# Patient Record
Sex: FEMALE
[De-identification: names, ages, dates, MRNs, and addresses within clinical notes are randomized per-mention and may not be internally consistent; named-entity substitution may affect disease eponyms.]

---

## 2024-08-27 ENCOUNTER — DASHBOARD ENCOUNTERS (OUTPATIENT)
Age: 54
End: 2024-08-27

## 2024-09-06 ENCOUNTER — LAB OUTSIDE AN ENCOUNTER (OUTPATIENT)
Dept: URBAN - METROPOLITAN AREA CLINIC 70 | Facility: CLINIC | Age: 54
End: 2024-09-06

## 2024-09-06 ENCOUNTER — OFFICE VISIT (OUTPATIENT)
Dept: URBAN - METROPOLITAN AREA CLINIC 70 | Facility: CLINIC | Age: 54
End: 2024-09-06
Payer: COMMERCIAL

## 2024-09-06 VITALS
BODY MASS INDEX: 47.09 KG/M2 | HEIGHT: 66 IN | HEART RATE: 79 BPM | DIASTOLIC BLOOD PRESSURE: 81 MMHG | WEIGHT: 293 LBS | SYSTOLIC BLOOD PRESSURE: 156 MMHG | TEMPERATURE: 97.7 F

## 2024-09-06 DIAGNOSIS — R13.19 ESOPHAGEAL DYSPHAGIA: ICD-10-CM

## 2024-09-06 DIAGNOSIS — Z86.010 HISTORY OF COLON POLYPS: ICD-10-CM

## 2024-09-06 PROCEDURE — 99204 OFFICE O/P NEW MOD 45 MIN: CPT | Performed by: REGISTERED NURSE

## 2024-09-06 RX ORDER — PANTOPRAZOLE SODIUM 40 MG/1
1 TABLET TABLET, DELAYED RELEASE ORAL ONCE A DAY
Qty: 90 TABLET | Refills: 3 | OUTPATIENT
Start: 2024-09-06

## 2024-09-06 RX ORDER — LEVOTHYROXINE SODIUM 125 UG/1
1 TABLET IN THE MORNING ON AN EMPTY STOMACH TABLET ORAL ONCE A DAY
Status: ACTIVE | COMMUNITY

## 2024-09-06 RX ORDER — CITALOPRAM 10 MG/1
1 TABLET TABLET, FILM COATED ORAL ONCE A DAY
Status: ACTIVE | COMMUNITY

## 2024-09-06 NOTE — HPI-TODAY'S VISIT:
Pt presents with c/o dysphagia. They report intermittent dysphagia for solids. Dysphagia has been present for 1 year. Associated symptoms are none. Prior workup was none. She denies any heartburn or other GERD type symptoms.  Pt presentsis also due for a colonoscopy. Colonoscopy was done 1/28/22 by Dr Nichole with finding of multiple adenomatous polyps, including 4 that were >1 cm in size. Pt currently denies any abdominal pain, diarrhea, constipation, rectal bleeding or weight loss.

## 2024-09-30 ENCOUNTER — OFFICE VISIT (OUTPATIENT)
Dept: URBAN - METROPOLITAN AREA MEDICAL CENTER 42 | Facility: MEDICAL CENTER | Age: 54
End: 2024-09-30

## 2024-09-30 RX ORDER — LEVOTHYROXINE SODIUM 125 UG/1
1 TABLET IN THE MORNING ON AN EMPTY STOMACH TABLET ORAL ONCE A DAY
Status: ACTIVE | COMMUNITY

## 2024-09-30 RX ORDER — CITALOPRAM 10 MG/1
1 TABLET TABLET, FILM COATED ORAL ONCE A DAY
Status: ACTIVE | COMMUNITY

## 2024-09-30 RX ORDER — PANTOPRAZOLE SODIUM 40 MG/1
1 TABLET TABLET, DELAYED RELEASE ORAL ONCE A DAY
Qty: 90 TABLET | Refills: 3 | Status: ACTIVE | COMMUNITY
Start: 2024-09-06

## 2024-11-01 ENCOUNTER — OFFICE VISIT (OUTPATIENT)
Dept: URBAN - METROPOLITAN AREA CLINIC 70 | Facility: CLINIC | Age: 54
End: 2024-11-01
Payer: COMMERCIAL

## 2024-11-01 VITALS
DIASTOLIC BLOOD PRESSURE: 74 MMHG | HEIGHT: 66 IN | TEMPERATURE: 98.5 F | SYSTOLIC BLOOD PRESSURE: 117 MMHG | BODY MASS INDEX: 47.09 KG/M2 | HEART RATE: 70 BPM | WEIGHT: 293 LBS

## 2024-11-01 DIAGNOSIS — R19.7 DIARRHEA, UNSPECIFIED: ICD-10-CM

## 2024-11-01 PROCEDURE — 99214 OFFICE O/P EST MOD 30 MIN: CPT | Performed by: REGISTERED NURSE

## 2024-11-01 RX ORDER — CITALOPRAM 10 MG/1
1 TABLET TABLET, FILM COATED ORAL ONCE A DAY
Status: ACTIVE | COMMUNITY

## 2024-11-01 RX ORDER — PANTOPRAZOLE SODIUM 40 MG/1
1 TABLET TABLET, DELAYED RELEASE ORAL ONCE A DAY
Qty: 90 TABLET | Refills: 3 | Status: ACTIVE | COMMUNITY
Start: 2024-09-06

## 2024-11-01 RX ORDER — LEVOTHYROXINE SODIUM 125 UG/1
1 TABLET IN THE MORNING ON AN EMPTY STOMACH TABLET ORAL ONCE A DAY
Status: ACTIVE | COMMUNITY

## 2024-11-01 NOTE — HPI-TODAY'S VISIT:
EGD 9/30/24 showed a benign esophageal stricture that was dilated to 18mm Colonoscopy 9/30/24 was normal(5 yr recall due to hx polyps)  Her dysphagia has completely resolved after the dilatation.  Pt c/o diarrhea. Diarrhea has been present for 1 year although she did not report this at her last OV. They are having 4-5 BMs/day. Stool is loose. Associated symptoms are none.

## 2024-11-01 NOTE — HPI-OTHER HISTORIES
Note from OV 9/6/24: Pt presents with c/o dysphagia. They report intermittent dysphagia for solids. Dysphagia has been present for 1 year. Associated symptoms are none. Prior workup was none. She denies any heartburn or other GERD type symptoms. Pt presentsis also due for a colonoscopy. Colonoscopy was done 1/28/22 by Dr Nichole with finding of multiple adenomatous polyps, including 4 that were >1 cm in size. Pt currently denies any abdominal pain, diarrhea, constipation, rectal bleeding or weight loss. - - - - - - - - - - - - - - - - - - - - - - - - - -

## 2024-11-25 LAB
IMMUNOGLOBULIN A, QN, SERUM: 308
INTERPRETATION: (no result)
T-TRANSGLUTAMINASE (TTG) IGA: <1

## 2024-12-13 ENCOUNTER — OFFICE VISIT (OUTPATIENT)
Dept: URBAN - METROPOLITAN AREA CLINIC 70 | Facility: CLINIC | Age: 54
End: 2024-12-13
Payer: COMMERCIAL

## 2024-12-13 VITALS
DIASTOLIC BLOOD PRESSURE: 78 MMHG | WEIGHT: 293 LBS | HEIGHT: 66 IN | OXYGEN SATURATION: 96 % | SYSTOLIC BLOOD PRESSURE: 134 MMHG | HEART RATE: 76 BPM | BODY MASS INDEX: 47.09 KG/M2 | TEMPERATURE: 97.9 F

## 2024-12-13 DIAGNOSIS — K58.0 IRRITABLE BOWEL SYNDROME WITH DIARRHEA: ICD-10-CM

## 2024-12-13 PROCEDURE — 99214 OFFICE O/P EST MOD 30 MIN: CPT | Performed by: REGISTERED NURSE

## 2024-12-13 RX ORDER — CITALOPRAM 10 MG/1
1 TABLET TABLET, FILM COATED ORAL ONCE A DAY
Status: ACTIVE | COMMUNITY

## 2024-12-13 RX ORDER — CHOLESTYRAMINE 4 G/9G
1 SCOOP POWDER, FOR SUSPENSION ORAL
Qty: 240 GM | Refills: 5 | OUTPATIENT
Start: 2024-12-13

## 2024-12-13 RX ORDER — LEVOTHYROXINE SODIUM 125 UG/1
1 TABLET IN THE MORNING ON AN EMPTY STOMACH TABLET ORAL ONCE A DAY
Status: ACTIVE | COMMUNITY

## 2024-12-13 RX ORDER — PANTOPRAZOLE SODIUM 40 MG/1
1 TABLET TABLET, DELAYED RELEASE ORAL ONCE A DAY
Qty: 90 TABLET | Refills: 3 | Status: ACTIVE | COMMUNITY
Start: 2024-09-06

## 2024-12-13 NOTE — HPI-TODAY'S VISIT:
Labs and stool studies were normal. She continues to have loose stools every day. The diarrhea seemed to start around the time her  passed away and she feels like it is due to stress. She has tried taking Imodium which will constipate her for a few days and then she will go back to having loose stools again.

## 2024-12-13 NOTE — HPI-OTHER HISTORIES
Note from OV 9/6/24: Pt presents with c/o dysphagia. They report intermittent dysphagia for solids. Dysphagia has been present for 1 year. Associated symptoms are none. Prior workup was none. She denies any heartburn or other GERD type symptoms. Pt presentsis also due for a colonoscopy. Colonoscopy was done 1/28/22 by Dr Nichole with finding of multiple adenomatous polyps, including 4 that were >1 cm in size. Pt currently denies any abdominal pain, diarrhea, constipation, rectal bleeding or weight loss. - - - - - - - - - - - - - - - - - - - - - - - - - - Note from OV 11/1/24: EGD 9/30/24 showed a benign esophageal stricture that was dilated to 18mm Colonoscopy 9/30/24 was normal(5 yr recall due to hx polyps)  Her dysphagia has completely resolved after the dilatation.  Pt c/o diarrhea. Diarrhea has been present for 1 year although she did not report this at her last OV. They are having 4-5 BMs/day. Stool is loose. Associated symptoms are none. - - - - - - - - - - - - - - - - - - - - - - - - -

## 2025-02-13 ENCOUNTER — OFFICE VISIT (OUTPATIENT)
Dept: URBAN - METROPOLITAN AREA CLINIC 70 | Facility: CLINIC | Age: 55
End: 2025-02-13